# Patient Record
Sex: MALE | URBAN - METROPOLITAN AREA
[De-identification: names, ages, dates, MRNs, and addresses within clinical notes are randomized per-mention and may not be internally consistent; named-entity substitution may affect disease eponyms.]

---

## 2018-09-05 ENCOUNTER — NEW PATIENT (OUTPATIENT)
Dept: URBAN - METROPOLITAN AREA CLINIC 44 | Facility: CLINIC | Age: 78
End: 2018-09-05
Payer: COMMERCIAL

## 2018-09-05 PROCEDURE — 92004 COMPRE OPH EXAM NEW PT 1/>: CPT | Performed by: OPTOMETRIST

## 2018-09-05 PROCEDURE — 92015 DETERMINE REFRACTIVE STATE: CPT | Performed by: OPTOMETRIST

## 2018-09-05 ASSESSMENT — INTRAOCULAR PRESSURE
OS: 18
OD: 18

## 2018-09-05 ASSESSMENT — VISUAL ACUITY
OD: 20/20
OS: 20/20

## 2018-09-05 ASSESSMENT — KERATOMETRY
OS: 43.38
OD: 43.88

## 2020-02-13 ENCOUNTER — FOLLOW UP ESTABLISHED (OUTPATIENT)
Dept: URBAN - METROPOLITAN AREA CLINIC 44 | Facility: CLINIC | Age: 80
End: 2020-02-13
Payer: COMMERCIAL

## 2020-02-13 PROCEDURE — 92015 DETERMINE REFRACTIVE STATE: CPT | Performed by: OPTOMETRIST

## 2020-02-13 PROCEDURE — 92014 COMPRE OPH EXAM EST PT 1/>: CPT | Performed by: OPTOMETRIST

## 2020-02-13 ASSESSMENT — KERATOMETRY
OD: 44.13
OS: 43.63

## 2020-02-13 ASSESSMENT — VISUAL ACUITY
OD: 20/25
OS: 20/25

## 2020-02-13 ASSESSMENT — INTRAOCULAR PRESSURE
OD: 17
OS: 17

## 2021-03-23 ENCOUNTER — NEW PATIENT (OUTPATIENT)
Dept: URBAN - METROPOLITAN AREA CLINIC 44 | Facility: CLINIC | Age: 81
End: 2021-03-23
Payer: MEDICARE

## 2021-03-23 DIAGNOSIS — H04.123 TEAR FILM INSUFFICIENCY OF BILATERAL LACRIMAL GLANDS: ICD-10-CM

## 2021-03-23 DIAGNOSIS — Z79.84 LONG TERM (CURRENT) USE OF ORAL HYPOGLYCEMIC DRUGS: ICD-10-CM

## 2021-03-23 DIAGNOSIS — E11.9 TYPE 2 DIABETES MELLITUS WITHOUT COMPLICATIONS: ICD-10-CM

## 2021-03-23 DIAGNOSIS — H26.493 OTHER SECONDARY CATARACT, BILATERAL: Primary | ICD-10-CM

## 2021-03-23 PROCEDURE — 99204 OFFICE O/P NEW MOD 45 MIN: CPT | Performed by: OPHTHALMOLOGY

## 2021-03-23 PROCEDURE — 92134 CPTRZ OPH DX IMG PST SGM RTA: CPT | Performed by: OPHTHALMOLOGY

## 2021-03-23 ASSESSMENT — INTRAOCULAR PRESSURE
OD: 20
OS: 18

## 2021-03-23 ASSESSMENT — KERATOMETRY
OS: 43.50
OD: 43.75

## 2021-03-23 ASSESSMENT — VISUAL ACUITY
OD: 20/25
OS: 20/25

## 2021-08-10 ENCOUNTER — OFFICE VISIT (OUTPATIENT)
Dept: URBAN - METROPOLITAN AREA CLINIC 44 | Facility: CLINIC | Age: 81
End: 2021-08-10
Payer: MEDICARE

## 2021-08-10 DIAGNOSIS — Z96.1 PRESENCE OF INTRAOCULAR LENS: ICD-10-CM

## 2021-08-10 DIAGNOSIS — H40.013 OPEN ANGLE WITH BORDERLINE FINDINGS, LOW RISK, BILATERAL: Primary | ICD-10-CM

## 2021-08-10 DIAGNOSIS — H11.823 CONJUNCTIVOCHALASIS, BILATERAL: ICD-10-CM

## 2021-08-10 PROCEDURE — 99214 OFFICE O/P EST MOD 30 MIN: CPT | Performed by: OPHTHALMOLOGY

## 2021-08-10 PROCEDURE — 92083 EXTENDED VISUAL FIELD XM: CPT | Performed by: OPHTHALMOLOGY

## 2021-08-10 PROCEDURE — 92134 CPTRZ OPH DX IMG PST SGM RTA: CPT | Performed by: OPHTHALMOLOGY

## 2021-08-10 RX ORDER — LATANOPROST 50 UG/ML
0.005 % SOLUTION OPHTHALMIC
Qty: 2.5 | Refills: 10 | Status: INACTIVE
Start: 2021-08-10 | End: 2021-10-19

## 2021-08-10 RX ORDER — LATANOPROST 50 UG/ML
0.005 % SOLUTION OPHTHALMIC
Qty: 2.5 | Refills: 10 | Status: INACTIVE
Start: 2021-08-10 | End: 2021-08-10

## 2021-08-10 ASSESSMENT — INTRAOCULAR PRESSURE
OS: 18
OD: 18

## 2021-08-10 ASSESSMENT — VISUAL ACUITY: OD: 20/30

## 2021-08-10 NOTE — IMPRESSION/PLAN
Impression: Conjunctivochalasis, bilateral Plan: Discussed daily artificial tears to improve with dry eye, and informed can purchase eyedrops over the counter.
Impression: Long term (current) use of oral antidiabetic drugs Plan: See primary plan
Impression: Open angle with borderline findings, low risk, bilateral: H40.013. Plan: RECOMMEND PATIENT START LATANOPROST OD QHS. CAN ADD TIMOLOL, TRUSOPT OR BRIMONIDINE IN THE FUTURE IF CLINICALLY WARRANTED.
Impression: Other secondary cataract, bilateral: H26.493. Plan: Discussed diagnosis with patient and recommend Yag laser capsulotomy. Discuss risks, benefits and alternatives of the procedure. 
Schedule OU OD first, 4-6 week F/U with Dr Katy Berry
Impression: Type 2 diabetes mellitus without complications Plan: No Background Diabetic Retinopathy, no Diabetic Macular Edema and no Neovascularization of the iris, disc, or elsewhere. Disc. ocular and systemic benefits of blood sugar control. The [employer] recommends target glycohemoglobin at 7.0% or less to minimize incidence of retinopathy as well as other systemic complications of diabetes mellitus. Send notes to PCP. Check annually.
Impression: Type 2 diabetes mellitus without complications: E88.6. Plan: Discussed good blood sugar and blood pressure - diet, exercise, and nutritional control emphasized to reduce future risk of diabetic complications. Maintain follow up with PCP.
<<-----Click here for Discharge Medication Review

## 2021-08-31 ENCOUNTER — ADULT PHYSICAL (OUTPATIENT)
Dept: URBAN - METROPOLITAN AREA CLINIC 44 | Facility: CLINIC | Age: 81
End: 2021-08-31
Payer: MEDICARE

## 2021-08-31 DIAGNOSIS — Z01.818 ENCOUNTER FOR OTHER PREPROCEDURAL EXAMINATION: Primary | ICD-10-CM

## 2021-08-31 PROCEDURE — 99203 OFFICE O/P NEW LOW 30 MIN: CPT | Performed by: PHYSICIAN ASSISTANT

## 2021-09-10 ENCOUNTER — SURGERY (OUTPATIENT)
Dept: URBAN - METROPOLITAN AREA SURGERY 19 | Facility: SURGERY | Age: 81
End: 2021-09-10
Payer: MEDICARE

## 2021-09-10 PROCEDURE — 66821 AFTER CATARACT LASER SURGERY: CPT | Performed by: OPHTHALMOLOGY

## 2021-09-24 ENCOUNTER — SURGERY (OUTPATIENT)
Dept: URBAN - METROPOLITAN AREA SURGERY 19 | Facility: SURGERY | Age: 81
End: 2021-09-24
Payer: MEDICARE

## 2021-09-24 PROCEDURE — 66821 AFTER CATARACT LASER SURGERY: CPT | Performed by: OPHTHALMOLOGY

## 2021-10-19 ENCOUNTER — OFFICE VISIT (OUTPATIENT)
Dept: URBAN - METROPOLITAN AREA CLINIC 44 | Facility: CLINIC | Age: 81
End: 2021-10-19
Payer: MEDICARE

## 2021-10-19 DIAGNOSIS — H52.4 PRESBYOPIA: ICD-10-CM

## 2021-10-19 PROCEDURE — 92012 INTRM OPH EXAM EST PATIENT: CPT | Performed by: OPHTHALMOLOGY

## 2021-10-19 RX ORDER — LATANOPROST 50 UG/ML
0.005 % SOLUTION OPHTHALMIC
Qty: 7.5 | Refills: 4 | Status: ACTIVE
Start: 2021-10-19

## 2021-10-19 ASSESSMENT — INTRAOCULAR PRESSURE
OD: 13
OS: 13

## 2021-10-19 ASSESSMENT — KERATOMETRY
OD: 43.50
OS: 43.38

## 2021-10-19 ASSESSMENT — VISUAL ACUITY
OD: 20/30
OS: 20/30

## 2021-10-19 NOTE — IMPRESSION/PLAN
Impression: Open angle with borderline findings, low risk, bilateral: H40.013. Plan: Discussed with patient today's findings. Emphasized and explained compliance with medications. Poor compliance can lead to blindness. 
Cont Lat QHS OD

## 2021-10-19 NOTE — IMPRESSION/PLAN
Impression: Conjunctivochalasis, bilateral Plan: Discussed daily artificial tears to improve with dry eye, and informed can purchase eyedrops over the counter.

## 2022-06-07 ENCOUNTER — OFFICE VISIT (OUTPATIENT)
Dept: URBAN - METROPOLITAN AREA CLINIC 44 | Facility: CLINIC | Age: 82
End: 2022-06-07
Payer: MEDICARE

## 2022-06-07 DIAGNOSIS — Z79.84 LONG TERM (CURRENT) USE OF ORAL HYPOGLYCEMIC DRUGS: ICD-10-CM

## 2022-06-07 DIAGNOSIS — E11.9 TYPE 2 DIABETES MELLITUS WITHOUT COMPLICATIONS: ICD-10-CM

## 2022-06-07 DIAGNOSIS — Z96.1 PRESENCE OF INTRAOCULAR LENS: ICD-10-CM

## 2022-06-07 DIAGNOSIS — H11.823 CONJUNCTIVOCHALASIS, BILATERAL: ICD-10-CM

## 2022-06-07 DIAGNOSIS — H40.013 OPEN ANGLE WITH BORDERLINE FINDINGS, LOW RISK, BILATERAL: Primary | ICD-10-CM

## 2022-06-07 PROCEDURE — 99214 OFFICE O/P EST MOD 30 MIN: CPT | Performed by: OPHTHALMOLOGY

## 2022-06-07 ASSESSMENT — INTRAOCULAR PRESSURE
OD: 13
OS: 14

## 2022-06-07 ASSESSMENT — VISUAL ACUITY
OD: 20/30
OS: 20/25

## 2022-06-07 NOTE — IMPRESSION/PLAN
Impression: Type 2 diabetes mellitus without complications: U26.0. Plan: Discussed good blood sugar and blood pressure - diet, exercise, and nutritional control emphasized to reduce future risk of diabetic complications. Maintain follow up with PCP.

## 2022-12-27 ENCOUNTER — OFFICE VISIT (OUTPATIENT)
Dept: URBAN - METROPOLITAN AREA CLINIC 44 | Facility: CLINIC | Age: 82
End: 2022-12-27
Payer: MEDICARE

## 2022-12-27 DIAGNOSIS — Z79.84 LONG TERM (CURRENT) USE OF ORAL HYPOGLYCEMIC DRUGS: ICD-10-CM

## 2022-12-27 DIAGNOSIS — H40.013 OPEN ANGLE WITH BORDERLINE FINDINGS, LOW RISK, BILATERAL: Primary | ICD-10-CM

## 2022-12-27 DIAGNOSIS — Z96.1 PRESENCE OF INTRAOCULAR LENS: ICD-10-CM

## 2022-12-27 DIAGNOSIS — E11.9 TYPE 2 DIABETES MELLITUS WITHOUT COMPLICATIONS: ICD-10-CM

## 2022-12-27 DIAGNOSIS — H11.823 CONJUNCTIVOCHALASIS, BILATERAL: ICD-10-CM

## 2022-12-27 PROCEDURE — 92133 CPTRZD OPH DX IMG PST SGM ON: CPT | Performed by: OPHTHALMOLOGY

## 2022-12-27 PROCEDURE — 92134 CPTRZ OPH DX IMG PST SGM RTA: CPT | Performed by: OPHTHALMOLOGY

## 2022-12-27 PROCEDURE — 99214 OFFICE O/P EST MOD 30 MIN: CPT | Performed by: OPHTHALMOLOGY

## 2022-12-27 PROCEDURE — 92083 EXTENDED VISUAL FIELD XM: CPT | Performed by: OPHTHALMOLOGY

## 2022-12-27 RX ORDER — LATANOPROST 50 UG/ML
0.005 % SOLUTION OPHTHALMIC
Qty: 90 | Refills: 3 | Status: INACTIVE
Start: 2022-12-27 | End: 2022-12-27

## 2022-12-27 ASSESSMENT — INTRAOCULAR PRESSURE
OD: 12
OS: 14

## 2022-12-27 NOTE — IMPRESSION/PLAN
Impression: Type 2 diabetes mellitus without complications: X42.0. Plan: Discussed good blood sugar and blood pressure - diet, exercise, and nutritional control emphasized to reduce future risk of diabetic complications. Maintain follow up with PCP.

## 2022-12-27 NOTE — IMPRESSION/PLAN
Impression: Open angle with borderline findings, low risk, bilateral: H40.013. Plan: Discussed with patient today's findings. Stop Latanoprost and see Glaucoma in 3 months w/VF 24-2

## 2024-01-08 ENCOUNTER — OFFICE VISIT (OUTPATIENT)
Dept: URBAN - METROPOLITAN AREA CLINIC 44 | Facility: CLINIC | Age: 84
End: 2024-01-08
Payer: MEDICARE

## 2024-01-08 DIAGNOSIS — H52.4 PRESBYOPIA: ICD-10-CM

## 2024-01-08 DIAGNOSIS — Z96.1 PRESENCE OF INTRAOCULAR LENS: ICD-10-CM

## 2024-01-08 DIAGNOSIS — E11.9 TYPE 2 DIABETES MELLITUS WITHOUT COMPLICATIONS: Primary | ICD-10-CM

## 2024-01-08 DIAGNOSIS — H04.123 TEAR FILM INSUFFICIENCY OF BILATERAL LACRIMAL GLANDS: ICD-10-CM

## 2024-01-08 PROCEDURE — 92004 COMPRE OPH EXAM NEW PT 1/>: CPT | Performed by: OPTOMETRIST

## 2024-01-08 ASSESSMENT — INTRAOCULAR PRESSURE
OS: 14
OD: 13

## 2024-01-08 ASSESSMENT — KERATOMETRY
OS: 43.75
OD: 43.88

## 2024-01-08 ASSESSMENT — VISUAL ACUITY
OD: 20/30
OS: 20/25